# Patient Record
Sex: FEMALE | Race: WHITE | Employment: FULL TIME | URBAN - METROPOLITAN AREA
[De-identification: names, ages, dates, MRNs, and addresses within clinical notes are randomized per-mention and may not be internally consistent; named-entity substitution may affect disease eponyms.]

---

## 2021-04-04 ENCOUNTER — APPOINTMENT (OUTPATIENT)
Dept: CT IMAGING | Age: 41
DRG: 419 | End: 2021-04-04
Attending: INTERNAL MEDICINE
Payer: COMMERCIAL

## 2021-04-04 ENCOUNTER — HOSPITAL ENCOUNTER (INPATIENT)
Age: 41
LOS: 2 days | Discharge: HOME | DRG: 419 | End: 2021-04-06
Attending: INTERNAL MEDICINE | Admitting: INTERNAL MEDICINE
Payer: COMMERCIAL

## 2021-04-04 DIAGNOSIS — K81.0 ACUTE CHOLECYSTITIS: Primary | ICD-10-CM

## 2021-04-04 LAB
ABSOLUTE EOS #: 0.2 K/UL (ref 0–0.4)
ABSOLUTE IMMATURE GRANULOCYTE: ABNORMAL K/UL (ref 0–0.3)
ABSOLUTE LYMPH #: 1.7 K/UL (ref 1–4.8)
ABSOLUTE MONO #: 0.8 K/UL (ref 0.1–1.3)
ALBUMIN SERPL-MCNC: 4.1 G/DL (ref 3.5–5.2)
ALBUMIN/GLOBULIN RATIO: NORMAL (ref 1–2.5)
ALP BLD-CCNC: 64 U/L (ref 35–104)
ALT SERPL-CCNC: 15 U/L (ref 5–33)
ANION GAP SERPL CALCULATED.3IONS-SCNC: 6 MMOL/L (ref 9–17)
AST SERPL-CCNC: 18 U/L
BASOPHILS # BLD: 1 % (ref 0–2)
BASOPHILS ABSOLUTE: 0.1 K/UL (ref 0–0.2)
BILIRUB SERPL-MCNC: 0.39 MG/DL (ref 0.3–1.2)
BILIRUBIN DIRECT: 0.12 MG/DL
BILIRUBIN, INDIRECT: 0.27 MG/DL (ref 0–1)
BUN BLDV-MCNC: 8 MG/DL (ref 6–20)
BUN/CREAT BLD: ABNORMAL (ref 9–20)
CALCIUM SERPL-MCNC: 8.8 MG/DL (ref 8.6–10.4)
CHLORIDE BLD-SCNC: 107 MMOL/L (ref 98–107)
CO2: 27 MMOL/L (ref 20–31)
CREAT SERPL-MCNC: 0.73 MG/DL (ref 0.5–0.9)
DIFFERENTIAL TYPE: ABNORMAL
EOSINOPHILS RELATIVE PERCENT: 2 % (ref 0–4)
GFR AFRICAN AMERICAN: >60 ML/MIN
GFR NON-AFRICAN AMERICAN: >60 ML/MIN
GFR SERPL CREATININE-BSD FRML MDRD: ABNORMAL ML/MIN/{1.73_M2}
GFR SERPL CREATININE-BSD FRML MDRD: ABNORMAL ML/MIN/{1.73_M2}
GLOBULIN: NORMAL G/DL (ref 1.5–3.8)
GLUCOSE BLD-MCNC: 124 MG/DL (ref 70–99)
HCT VFR BLD CALC: 42.7 % (ref 36–46)
HEMOGLOBIN: 14.1 G/DL (ref 12–16)
IMMATURE GRANULOCYTES: ABNORMAL %
INR BLD: 1
LIPASE: 21 U/L (ref 13–60)
LYMPHOCYTES # BLD: 16 % (ref 24–44)
MCH RBC QN AUTO: 29.9 PG (ref 26–34)
MCHC RBC AUTO-ENTMCNC: 32.9 G/DL (ref 31–37)
MCV RBC AUTO: 90.9 FL (ref 80–100)
MONOCYTES # BLD: 7 % (ref 1–7)
NRBC AUTOMATED: ABNORMAL PER 100 WBC
PDW BLD-RTO: 13 % (ref 11.5–14.9)
PLATELET # BLD: 352 K/UL (ref 150–450)
PLATELET ESTIMATE: ABNORMAL
PMV BLD AUTO: 6.8 FL (ref 6–12)
POTASSIUM SERPL-SCNC: 3.9 MMOL/L (ref 3.7–5.3)
PROTHROMBIN TIME: 13.3 SEC (ref 11.8–14.6)
RBC # BLD: 4.7 M/UL (ref 4–5.2)
RBC # BLD: ABNORMAL 10*6/UL
SEG NEUTROPHILS: 74 % (ref 36–66)
SEGMENTED NEUTROPHILS ABSOLUTE COUNT: 8.4 K/UL (ref 1.3–9.1)
SODIUM BLD-SCNC: 140 MMOL/L (ref 135–144)
TOTAL PROTEIN: 6.6 G/DL (ref 6.4–8.3)
WBC # BLD: 11.2 K/UL (ref 3.5–11)
WBC # BLD: ABNORMAL 10*3/UL

## 2021-04-04 PROCEDURE — 83690 ASSAY OF LIPASE: CPT

## 2021-04-04 PROCEDURE — 6370000000 HC RX 637 (ALT 250 FOR IP): Performed by: SURGERY

## 2021-04-04 PROCEDURE — 80048 BASIC METABOLIC PNL TOTAL CA: CPT

## 2021-04-04 PROCEDURE — G0378 HOSPITAL OBSERVATION PER HR: HCPCS

## 2021-04-04 PROCEDURE — 2580000003 HC RX 258: Performed by: SURGERY

## 2021-04-04 PROCEDURE — 80076 HEPATIC FUNCTION PANEL: CPT

## 2021-04-04 PROCEDURE — 85025 COMPLETE CBC W/AUTO DIFF WBC: CPT

## 2021-04-04 PROCEDURE — 96375 TX/PRO/DX INJ NEW DRUG ADDON: CPT

## 2021-04-04 PROCEDURE — 2500000003 HC RX 250 WO HCPCS: Performed by: SURGERY

## 2021-04-04 PROCEDURE — 36415 COLL VENOUS BLD VENIPUNCTURE: CPT

## 2021-04-04 PROCEDURE — 1200000000 HC SEMI PRIVATE

## 2021-04-04 PROCEDURE — 6360000004 HC RX CONTRAST MEDICATION: Performed by: SURGERY

## 2021-04-04 PROCEDURE — 85610 PROTHROMBIN TIME: CPT

## 2021-04-04 PROCEDURE — 96366 THER/PROPH/DIAG IV INF ADDON: CPT

## 2021-04-04 PROCEDURE — 96365 THER/PROPH/DIAG IV INF INIT: CPT

## 2021-04-04 PROCEDURE — G0379 DIRECT REFER HOSPITAL OBSERV: HCPCS

## 2021-04-04 PROCEDURE — 6360000002 HC RX W HCPCS: Performed by: SURGERY

## 2021-04-04 PROCEDURE — 74177 CT ABD & PELVIS W/CONTRAST: CPT

## 2021-04-04 RX ORDER — FENTANYL CITRATE 50 UG/ML
50 INJECTION, SOLUTION INTRAMUSCULAR; INTRAVENOUS
Status: DISCONTINUED | OUTPATIENT
Start: 2021-04-04 | End: 2021-04-06 | Stop reason: HOSPADM

## 2021-04-04 RX ORDER — SODIUM CHLORIDE 9 MG/ML
INJECTION, SOLUTION INTRAVENOUS CONTINUOUS
Status: DISCONTINUED | OUTPATIENT
Start: 2021-04-04 | End: 2021-04-06 | Stop reason: HOSPADM

## 2021-04-04 RX ORDER — ACETAMINOPHEN 325 MG/1
650 TABLET ORAL EVERY 6 HOURS PRN
Status: DISCONTINUED | OUTPATIENT
Start: 2021-04-04 | End: 2021-04-06 | Stop reason: HOSPADM

## 2021-04-04 RX ORDER — SODIUM CHLORIDE 0.9 % (FLUSH) 0.9 %
10 SYRINGE (ML) INJECTION PRN
Status: DISCONTINUED | OUTPATIENT
Start: 2021-04-04 | End: 2021-04-06 | Stop reason: HOSPADM

## 2021-04-04 RX ORDER — FENTANYL CITRATE 50 UG/ML
100 INJECTION, SOLUTION INTRAMUSCULAR; INTRAVENOUS
Status: DISCONTINUED | OUTPATIENT
Start: 2021-04-04 | End: 2021-04-06 | Stop reason: HOSPADM

## 2021-04-04 RX ORDER — ONDANSETRON 2 MG/ML
4 INJECTION INTRAMUSCULAR; INTRAVENOUS EVERY 6 HOURS PRN
Status: DISCONTINUED | OUTPATIENT
Start: 2021-04-04 | End: 2021-04-06 | Stop reason: HOSPADM

## 2021-04-04 RX ORDER — 0.9 % SODIUM CHLORIDE 0.9 %
80 INTRAVENOUS SOLUTION INTRAVENOUS ONCE
Status: COMPLETED | OUTPATIENT
Start: 2021-04-04 | End: 2021-04-04

## 2021-04-04 RX ADMIN — ACETAMINOPHEN 650 MG: 325 TABLET, FILM COATED ORAL at 21:49

## 2021-04-04 RX ADMIN — FAMOTIDINE 20 MG: 10 INJECTION, SOLUTION INTRAVENOUS at 21:48

## 2021-04-04 RX ADMIN — SODIUM CHLORIDE 80 ML: 9 INJECTION, SOLUTION INTRAVENOUS at 21:13

## 2021-04-04 RX ADMIN — SODIUM CHLORIDE, PRESERVATIVE FREE 10 ML: 5 INJECTION INTRAVENOUS at 21:13

## 2021-04-04 RX ADMIN — SODIUM CHLORIDE: 9 INJECTION, SOLUTION INTRAVENOUS at 21:50

## 2021-04-04 RX ADMIN — IOPAMIDOL 75 ML: 755 INJECTION, SOLUTION INTRAVENOUS at 21:13

## 2021-04-04 RX ADMIN — PIPERACILLIN SODIUM AND TAZOBACTAM SODIUM 3375 MG: 3; .375 INJECTION, POWDER, LYOPHILIZED, FOR SOLUTION INTRAVENOUS at 21:50

## 2021-04-04 RX ADMIN — IOHEXOL 50 ML: 240 INJECTION, SOLUTION INTRATHECAL; INTRAVASCULAR; INTRAVENOUS; ORAL at 19:53

## 2021-04-04 NOTE — PROGRESS NOTES
RN verified via records from [facility] emergency department paperwork and care everywhere that patient has a NEGATIVE COVID test and a NEGATIVE urine pregnancy test at their facility 4/4 prior to transfer via squad to [address] [place] [address] room 2071.

## 2021-04-05 ENCOUNTER — APPOINTMENT (OUTPATIENT)
Dept: NUCLEAR MEDICINE | Age: 41
DRG: 419 | End: 2021-04-05
Attending: INTERNAL MEDICINE
Payer: COMMERCIAL

## 2021-04-05 ENCOUNTER — ANESTHESIA EVENT (OUTPATIENT)
Dept: OPERATING ROOM | Age: 41
DRG: 419 | End: 2021-04-05
Payer: COMMERCIAL

## 2021-04-05 LAB
ABSOLUTE EOS #: 0.3 K/UL (ref 0–0.4)
ABSOLUTE IMMATURE GRANULOCYTE: ABNORMAL K/UL (ref 0–0.3)
ABSOLUTE LYMPH #: 2 K/UL (ref 1–4.8)
ABSOLUTE MONO #: 0.7 K/UL (ref 0.1–1.3)
ALBUMIN SERPL-MCNC: 3.6 G/DL (ref 3.5–5.2)
ALBUMIN/GLOBULIN RATIO: ABNORMAL (ref 1–2.5)
ALP BLD-CCNC: 58 U/L (ref 35–104)
ALT SERPL-CCNC: 17 U/L (ref 5–33)
ANION GAP SERPL CALCULATED.3IONS-SCNC: 7 MMOL/L (ref 9–17)
AST SERPL-CCNC: 26 U/L
BASOPHILS # BLD: 1 % (ref 0–2)
BASOPHILS ABSOLUTE: 0.1 K/UL (ref 0–0.2)
BILIRUB SERPL-MCNC: 0.45 MG/DL (ref 0.3–1.2)
BILIRUBIN DIRECT: 0.13 MG/DL
BILIRUBIN, INDIRECT: 0.32 MG/DL (ref 0–1)
BUN BLDV-MCNC: 7 MG/DL (ref 6–20)
BUN/CREAT BLD: ABNORMAL (ref 9–20)
CALCIUM SERPL-MCNC: 8.3 MG/DL (ref 8.6–10.4)
CHLORIDE BLD-SCNC: 108 MMOL/L (ref 98–107)
CO2: 25 MMOL/L (ref 20–31)
CREAT SERPL-MCNC: 0.77 MG/DL (ref 0.5–0.9)
DIFFERENTIAL TYPE: ABNORMAL
EOSINOPHILS RELATIVE PERCENT: 4 % (ref 0–4)
GFR AFRICAN AMERICAN: >60 ML/MIN
GFR NON-AFRICAN AMERICAN: >60 ML/MIN
GFR SERPL CREATININE-BSD FRML MDRD: ABNORMAL ML/MIN/{1.73_M2}
GFR SERPL CREATININE-BSD FRML MDRD: ABNORMAL ML/MIN/{1.73_M2}
GLOBULIN: ABNORMAL G/DL (ref 1.5–3.8)
GLUCOSE BLD-MCNC: 121 MG/DL (ref 70–99)
HCT VFR BLD CALC: 39.1 % (ref 36–46)
HEMOGLOBIN: 13 G/DL (ref 12–16)
IMMATURE GRANULOCYTES: ABNORMAL %
LIPASE: 22 U/L (ref 13–60)
LYMPHOCYTES # BLD: 24 % (ref 24–44)
MCH RBC QN AUTO: 30.1 PG (ref 26–34)
MCHC RBC AUTO-ENTMCNC: 33.2 G/DL (ref 31–37)
MCV RBC AUTO: 90.6 FL (ref 80–100)
MONOCYTES # BLD: 8 % (ref 1–7)
NRBC AUTOMATED: ABNORMAL PER 100 WBC
PDW BLD-RTO: 13.3 % (ref 11.5–14.9)
PLATELET # BLD: 299 K/UL (ref 150–450)
PLATELET ESTIMATE: ABNORMAL
PMV BLD AUTO: 6.7 FL (ref 6–12)
POTASSIUM SERPL-SCNC: 4.3 MMOL/L (ref 3.7–5.3)
RBC # BLD: 4.32 M/UL (ref 4–5.2)
RBC # BLD: ABNORMAL 10*6/UL
SARS-COV-2, RAPID: NOT DETECTED
SEG NEUTROPHILS: 63 % (ref 36–66)
SEGMENTED NEUTROPHILS ABSOLUTE COUNT: 5.3 K/UL (ref 1.3–9.1)
SODIUM BLD-SCNC: 140 MMOL/L (ref 135–144)
SPECIMEN DESCRIPTION: NORMAL
TOTAL PROTEIN: 5.7 G/DL (ref 6.4–8.3)
WBC # BLD: 8.4 K/UL (ref 3.5–11)
WBC # BLD: ABNORMAL 10*3/UL

## 2021-04-05 PROCEDURE — 87635 SARS-COV-2 COVID-19 AMP PRB: CPT

## 2021-04-05 PROCEDURE — 1200000000 HC SEMI PRIVATE

## 2021-04-05 PROCEDURE — 93005 ELECTROCARDIOGRAM TRACING: CPT

## 2021-04-05 PROCEDURE — 80048 BASIC METABOLIC PNL TOTAL CA: CPT

## 2021-04-05 PROCEDURE — 36415 COLL VENOUS BLD VENIPUNCTURE: CPT

## 2021-04-05 PROCEDURE — 6360000002 HC RX W HCPCS: Performed by: INTERNAL MEDICINE

## 2021-04-05 PROCEDURE — 99222 1ST HOSP IP/OBS MODERATE 55: CPT | Performed by: INTERNAL MEDICINE

## 2021-04-05 PROCEDURE — 3430000000 HC RX DIAGNOSTIC RADIOPHARMACEUTICAL: Performed by: SURGERY

## 2021-04-05 PROCEDURE — 6360000002 HC RX W HCPCS: Performed by: SURGERY

## 2021-04-05 PROCEDURE — 85025 COMPLETE CBC W/AUTO DIFF WBC: CPT

## 2021-04-05 PROCEDURE — 2580000003 HC RX 258: Performed by: SURGERY

## 2021-04-05 PROCEDURE — 96375 TX/PRO/DX INJ NEW DRUG ADDON: CPT

## 2021-04-05 PROCEDURE — 83690 ASSAY OF LIPASE: CPT

## 2021-04-05 PROCEDURE — 78226 HEPATOBILIARY SYSTEM IMAGING: CPT

## 2021-04-05 PROCEDURE — G0378 HOSPITAL OBSERVATION PER HR: HCPCS

## 2021-04-05 PROCEDURE — 80076 HEPATIC FUNCTION PANEL: CPT

## 2021-04-05 PROCEDURE — 2500000003 HC RX 250 WO HCPCS: Performed by: SURGERY

## 2021-04-05 PROCEDURE — A9537 TC99M MEBROFENIN: HCPCS | Performed by: SURGERY

## 2021-04-05 PROCEDURE — 6370000000 HC RX 637 (ALT 250 FOR IP): Performed by: SURGERY

## 2021-04-05 PROCEDURE — 96376 TX/PRO/DX INJ SAME DRUG ADON: CPT

## 2021-04-05 PROCEDURE — 96366 THER/PROPH/DIAG IV INF ADDON: CPT

## 2021-04-05 PROCEDURE — 6360000002 HC RX W HCPCS: Performed by: RADIOLOGY

## 2021-04-05 RX ORDER — SODIUM CHLORIDE 0.9 % (FLUSH) 0.9 %
10 SYRINGE (ML) INJECTION PRN
Status: DISCONTINUED | OUTPATIENT
Start: 2021-04-05 | End: 2021-04-06 | Stop reason: HOSPADM

## 2021-04-05 RX ORDER — MORPHINE SULFATE 4 MG/ML
3.9 INJECTION, SOLUTION INTRAMUSCULAR; INTRAVENOUS ONCE
Status: COMPLETED | OUTPATIENT
Start: 2021-04-05 | End: 2021-04-05

## 2021-04-05 RX ADMIN — ENOXAPARIN SODIUM 40 MG: 40 INJECTION SUBCUTANEOUS at 15:16

## 2021-04-05 RX ADMIN — FAMOTIDINE 20 MG: 10 INJECTION, SOLUTION INTRAVENOUS at 10:51

## 2021-04-05 RX ADMIN — SODIUM CHLORIDE, PRESERVATIVE FREE 10 ML: 5 INJECTION INTRAVENOUS at 09:22

## 2021-04-05 RX ADMIN — PIPERACILLIN SODIUM AND TAZOBACTAM SODIUM 3375 MG: 3; .375 INJECTION, POWDER, LYOPHILIZED, FOR SOLUTION INTRAVENOUS at 10:51

## 2021-04-05 RX ADMIN — PIPERACILLIN SODIUM AND TAZOBACTAM SODIUM 3375 MG: 3; .375 INJECTION, POWDER, LYOPHILIZED, FOR SOLUTION INTRAVENOUS at 19:31

## 2021-04-05 RX ADMIN — ONDANSETRON 4 MG: 2 INJECTION INTRAMUSCULAR; INTRAVENOUS at 15:16

## 2021-04-05 RX ADMIN — SODIUM CHLORIDE, PRESERVATIVE FREE 10 ML: 5 INJECTION INTRAVENOUS at 08:06

## 2021-04-05 RX ADMIN — MORPHINE SULFATE 3.9 MG: 4 INJECTION, SOLUTION INTRAMUSCULAR; INTRAVENOUS at 09:27

## 2021-04-05 RX ADMIN — SODIUM CHLORIDE: 9 INJECTION, SOLUTION INTRAVENOUS at 10:05

## 2021-04-05 RX ADMIN — Medication 2.5 MILLICURIE: at 09:22

## 2021-04-05 RX ADMIN — ACETAMINOPHEN 650 MG: 325 TABLET, FILM COATED ORAL at 17:43

## 2021-04-05 RX ADMIN — FAMOTIDINE 20 MG: 10 INJECTION, SOLUTION INTRAVENOUS at 21:14

## 2021-04-05 RX ADMIN — PIPERACILLIN SODIUM AND TAZOBACTAM SODIUM 3375 MG: 3; .375 INJECTION, POWDER, LYOPHILIZED, FOR SOLUTION INTRAVENOUS at 03:45

## 2021-04-05 RX ADMIN — Medication 6.4 MILLICURIE: at 08:06

## 2021-04-05 RX ADMIN — SODIUM CHLORIDE: 9 INJECTION, SOLUTION INTRAVENOUS at 17:42

## 2021-04-05 ASSESSMENT — ENCOUNTER SYMPTOMS
STRIDOR: 0
SHORTNESS OF BREATH: 0

## 2021-04-05 ASSESSMENT — LIFESTYLE VARIABLES: SMOKING_STATUS: 0

## 2021-04-05 ASSESSMENT — PAIN SCALES - GENERAL: PAINLEVEL_OUTOF10: 0

## 2021-04-05 NOTE — PLAN OF CARE
Problem: Pain:  Goal: Pain level will decrease  4/5/2021 0411 by Lexy Segovia RN  Outcome: Ongoing  4/4/2021 1849 by Barbara Villegas RN  Outcome: Ongoing  Goal: Control of acute pain  4/5/2021 0411 by Lexy Segovia RN  Outcome: Ongoing  4/4/2021 1849 by Barbara Villegas RN  Outcome: Ongoing  Goal: Control of chronic pain  4/5/2021 0411 by Lexy Segovia RN  Outcome: Ongoing  4/4/2021 1849 by Barbara Villegas RN  Outcome: Ongoing     Problem: Nausea/Vomiting:  Goal: Absence of nausea/vomiting  4/5/2021 0411 by Lexy Segovia RN  Outcome: Ongoing  4/4/2021 1849 by Barbara Villegas RN  Outcome: Ongoing  Goal: Able to drink  4/5/2021 0411 by Lexy Segovia RN  Outcome: Ongoing  4/4/2021 1849 by Barbara Villegas RN  Outcome: Ongoing  Goal: Able to eat  4/5/2021 0411 by Lexy Segovia RN  Outcome: Ongoing  4/4/2021 1849 by Barbara Villegas RN  Outcome: Ongoing  Goal: Ability to achieve adequate nutritional intake will improve  4/5/2021 0411 by Lexy Segovia RN  Outcome: Ongoing  4/4/2021 1849 by Barbara Villegas RN  Outcome: Ongoing

## 2021-04-05 NOTE — CONSULTS
distress and cooperative to examination with proper mood and affect. SKIN: Skin color, texture, turgor normal. No rashes or lesions. LYMPH: no cervical nodes, no inguinal nodes  HEENT: Head is normocephalic, atraumatic. EOMI, PERRLA  NECK: Supple, symmetrical, trachea midline, no adenopathy, thyroid symmetric, not enlarged and no tenderness, skin normal  CHEST/LUNGS: chest symmetric with normal A/P diameter, normal respiratory rate and rhythm, lungs clear to auscultation without wheezes, rales or rhonchi. No accessory muscle use. Scars None   CARDIOVASCULAR: Heart regular rate and rhythm Normal S1 and S2. . Carotid and femoral pulses 2+/4 and equal bilaterally  ABDOMEN: Normal shape. . No scar(s) present. Normal bowel sounds. No bruits. Soft, nondistended, no masses or organomegaly. no evidence of hernia. Percussion: Normal without hepatosplenomegally. Tenderness: RUQ  RECTAL: deferred, not clinically indicated  NEUROLOGIC: There are no focalizing motor or sensory deficits. CN II-XII are grossly intact.   EXTREMITIES: no cyanosis, no clubbing and no edema    LABS:   CBC with Differential:    Lab Results   Component Value Date    WBC 8.4 04/05/2021    RBC 4.32 04/05/2021    HGB 13.0 04/05/2021    HCT 39.1 04/05/2021     04/05/2021    MCV 90.6 04/05/2021    MCH 30.1 04/05/2021    MCHC 33.2 04/05/2021    RDW 13.3 04/05/2021    LYMPHOPCT 24 04/05/2021    MONOPCT 8 04/05/2021    BASOPCT 1 04/05/2021    MONOSABS 0.70 04/05/2021    LYMPHSABS 2.00 04/05/2021    EOSABS 0.30 04/05/2021    BASOSABS 0.10 04/05/2021    DIFFTYPE NOT REPORTED 04/05/2021     BMP:    Lab Results   Component Value Date     04/05/2021    K 4.3 04/05/2021     04/05/2021    CO2 25 04/05/2021    BUN 7 04/05/2021    LABALBU 3.6 04/05/2021    CREATININE 0.77 04/05/2021    CALCIUM 8.3 04/05/2021    GFRAA >60 04/05/2021    LABGLOM >60 04/05/2021    GLUCOSE 121 04/05/2021     Hepatic Function Panel:    Lab Results   Component Value Date ALKPHOS 58 04/05/2021    ALT 17 04/05/2021    AST 26 04/05/2021    PROT 5.7 04/05/2021    BILITOT 0.45 04/05/2021    BILIDIR 0.13 04/05/2021    IBILI 0.32 04/05/2021    LABALBU 3.6 04/05/2021     Calcium:    Lab Results   Component Value Date    CALCIUM 8.3 04/05/2021     Magnesium:  No results found for: MG  Phosphorus:  No results found for: PHOS  PT/INR:    Lab Results   Component Value Date    PROTIME 13.3 04/04/2021    INR 1.0 04/04/2021     ABG:  No results found for: PHART, PH, VKJ9FNO, PCO2, PO2ART, PO2, KOQ3LIQ, HCO3, BEART, BE, THGBART, THB, BKI9OID, B6DHANYF, O2SAT  Urine Culture:  No components found for: CURINE  Blood Culture:  No components found for: CBLOOD, CFUNGUSBL  Stool Culture:  No components found for: CSTOOL    RADIOLOGY:   I have personally reviewed the following films:  Ct Abdomen Pelvis W Iv Contrast Additional Contrast? Oral    Result Date: 4/4/2021  EXAMINATION: CT OF THE ABDOMEN AND PELVIS WITH CONTRAST 4/4/2021 3:09 pm TECHNIQUE: CT of the abdomen and pelvis was performed with the administration of intravenous contrast. Multiplanar reformatted images are provided for review. Dose modulation, iterative reconstruction, and/or weight based adjustment of the mA/kV was utilized to reduce the radiation dose to as low as reasonably achievable. COMPARISON: None. HISTORY: ORDERING SYSTEM PROVIDED HISTORY: ab pain, cholecystitis TECHNOLOGIST PROVIDED HISTORY: ab pain, cholecystitis Reason for Exam: ab pain for one day, cholecystitis Acuity: Unknown Type of Exam: Unknown FINDINGS: Lower Chest: Dependent changes are present within the lung bases. Organs: A 9 mm low-attenuation lesion is present within the posterior segment of the right lobe of the liver, suggesting a small hemangioma or cyst. Faintly calcified gallstones are present. Gallbladder wall thickening is present, with pericholecystic fluid, and a mild degree of inflammation consistent with acute cholecystitis.   Common bile duct is nondilated. The spleen, pancreas, adrenal glands, appear normal.  Cortical cyst formation is present on the left kidney. No intrarenal calculi or hydronephrosis is present. GI/Bowel: Stomach appears normal.  Small bowel is normal without evidence of obstruction. The appendix is normal.  No inflammatory change or wall thickening is present involving the large bowel. Pelvis: Urinary bladder appears normal.  Uterus is normal in size. A pedunculated fibroid measuring 3 cm is present projecting off the right fundal portion of the uterus. Follicles are present on the ovaries. Small amount of free fluid is present within the cul-de-sac, likely physiologic Peritoneum/Retroperitoneum: No free air is present within the abdomen or pelvis. No aneurysm formation is present. No pathological adenopathy is present. Bones/Soft Tissues: No acute osseous or soft tissue abnormality is present. A small umbilical hernia is present containing fat. 1. CT findings consistent with acute cholecystitis 2. Small amount of free fluid present within the cul-de-sac, likely physiologic 3. 3 cm pedunculated fibroid extending off the right fundal portion of the uterus         IMPRESSION:   1. Acute cholecystitis   2. No abdominal surgeries in the past    does not have any pertinent problems on file. PLAN:   1. NPO  2. HIDA scan today  3. Daily CBC, BMP, trend LFT's  4. IVF, IV Zosyn  5. GI prophylaxis   6. Pain and nausea management   7. Will tentatively schedule patient for robotic laparoscopic cholecystectomy tomorrow  8. Continue medical management       Thank you for this interesting consult and for allowing us to participate in the care of this patient. If you have any questions please don't hesitate to call.       Electronically signed by CHANDRAKANT Moore  on 4/5/2021 at 7:20 AM

## 2021-04-05 NOTE — PLAN OF CARE
Problem: Pain:  Goal: Pain level will decrease  Description: Pain level will decrease  4/5/2021 1623 by Arabella White RN  Outcome: Ongoing  Note: No c/o any discomfort this shift. 4/5/2021 0411 by Albert Sandy RN  Outcome: Ongoing  Goal: Control of acute pain  Description: Control of acute pain  4/5/2021 1623 by Arabella White RN  Outcome: Ongoing  Note: No c/o any discomfort this shift. 4/5/2021 0411 by Albert Sandy RN  Outcome: Ongoing  Goal: Control of chronic pain  Description: Control of chronic pain  4/5/2021 1623 by Arabella White RN  Outcome: Ongoing  Note: No c/o any discomfort this shift.    4/5/2021 0411 by Albert Sandy RN  Outcome: Ongoing     Problem: Nausea/Vomiting:  Goal: Absence of nausea/vomiting  Description: Absence of nausea/vomiting  4/5/2021 1623 by Arabella White RN  Outcome: Ongoing  Note: Nausea/vomiting controlled with prescribed medications   4/5/2021 0411 by Albert Sandy RN  Outcome: Ongoing  Goal: Able to drink  Description: Able to drink  4/5/2021 1623 by Arabella White RN  Outcome: Ongoing  4/5/2021 0411 by Albert Sandy RN  Outcome: Ongoing  Goal: Able to eat  Description: Able to eat  4/5/2021 1623 by Arabella White RN  Outcome: Ongoing  4/5/2021 0411 by Albert Sandy RN  Outcome: Ongoing  Goal: Ability to achieve adequate nutritional intake will improve  Description: Ability to achieve adequate nutritional intake will improve  4/5/2021 1623 by Arabella White RN  Outcome: Ongoing  4/5/2021 0411 by Albert Sandy RN  Outcome: Ongoing

## 2021-04-05 NOTE — H&P
Formerly [facility] Internal Medicine    CONSULTATION / HISTORY AND PHYSICAL EXAMINATION            Date:   4/5/2021  Patient name:  Dorene Lee  Date of admission:  4/4/2021  6:43 PM  MRN:   [id number]  Account:  [de-identified]  YOB: 1980  PCP:    Gretchen Martínez  Room:   2071/2071-01  Code Status:    No Order    Physician Requesting Consult: Stef Silverman MD    Reason for Consult:  medical management    Chief Complaint:     No chief complaint on file. History Obtained From:     Patient medical record nursing staff    History of Present Illness:   Patient is a very healthy young woman, does not take any medication on regular basis, she was admitted yesterday with abdominal pain. Abdominal pain started on Saturday night around 11 PM, constant in nature, associated with nausea, mainly located right upper quadrant, no aggravating or relieving factor. Patient denied any complaints of loose stools, blood in stools, no vaginal discharge or vaginal bleeding. Patient presented to outlying facility, underwent ultrasound abdomen concerning for acute cholecystitis, patient transferred to [facility] she underwent CT abdomen pelvis, subsequently undergo HIDA scan concerning for cholecystitis  Patient evaluated by general surgery, started on IV Zosyn    Past Medical History:     No past medical history on file. Past Surgical History:     No past surgical history on file. Medications Prior to Admission:     Prior to Admission medications    Not on File        Allergies:     Patient has no known allergies. Social History:     Tobacco:    reports that she has never smoked. She has never used smokeless tobacco.  Alcohol:      has no history on file for alcohol. Drug Use:  has no history on file for drug. Family History:     No family history on file. Review of Systems:     Positive and Negative as described in HPI.     CONSTITUTIONAL:  negative for fevers, chills, sweats, fatigue, weight loss  HEENT:  negative for vision, hearing changes, runny nose, throat pain  RESPIRATORY:  negative for shortness of breath, cough, congestion, wheezing. CARDIOVASCULAR:  negative for chest pain, palpitations. GASTROINTESTINAL: Abdominal pain, nausea  GENITOURINARY:  negative for difficulty of urination, burning with urination, frequency   INTEGUMENT:  negative for rash, skin lesions, easy bruising   HEMATOLOGIC/LYMPHATIC:  negative for swelling/edema   ALLERGIC/IMMUNOLOGIC:  negative for urticaria , itching  ENDOCRINE:  negative increase in drinking, increase in urination, hot or cold intolerance  MUSCULOSKELETAL:  negative joint pains, muscle aches, swelling of joints  NEUROLOGICAL:  negative for headaches, dizziness, lightheadedness, numbness, pain, tingling extremities  BEHAVIOR/PSYCH:      Physical Exam:     /66   Pulse 88   Temp 98.5 °F (36.9 °C) (Oral)   Resp 15   Ht 5' 9\" (1.753 m)   Wt 215 lb (97.5 kg)   SpO2 98%   BMI 31.75 kg/m²   Temp (24hrs), Av.2 °F (36.8 °C), Min:97.9 °F (36.6 °C), Max:98.5 °F (36.9 °C)    No results for input(s): POCGLU in the last 72 hours. Intake/Output Summary (Last 24 hours) at 2021 1340  Last data filed at 2021 0558  Gross per 24 hour   Intake 1275 ml   Output --   Net 1275 ml       General Appearance:  alert, well appearing, and in no acute distress  Mental status: oriented to person, place, and time with normal affect  Head:  normocephalic, atraumatic. Eye: no icterus, redness, pupils equal and reactive, extraocular eye movements intact, conjunctiva clear  Ear: normal external ear, no discharge, hearing intact  Nose:  no drainage noted  Mouth: mucous membranes moist  Neck: supple, no carotid bruits, thyroid not palpable  Lungs: Bilateral equal air entry, clear to ausculation, no wheezing, rales or rhonchi, normal effort  Cardiovascular: normal rate, regular rhythm, no murmur, gallop, rub.   Abdomen: Tenderness right upper quadrant, negative Karimi sign  Neurologic: There are no new focal motor or sensory deficits, normal muscle tone and bulk, no abnormal sensation, normal speech, cranial nerves II through XII grossly intact  Skin: No gross lesions, rashes, bruising or bleeding on exposed skin area  Extremities:  peripheral pulses palpable, no pedal edema or calf pain with palpation  Psych:      Investigations:      Laboratory Testing:  Recent Results (from the past 24 hour(s))   CBC with DIFF    Collection Time: 04/04/21  7:41 PM   Result Value Ref Range    WBC 11.2 (H) 3.5 - 11.0 k/uL    RBC 4.70 4.0 - 5.2 m/uL    Hemoglobin 14.1 12.0 - 16.0 g/dL    Hematocrit 42.7 36 - 46 %    MCV 90.9 80 - 100 fL    MCH 29.9 26 - 34 pg    MCHC 32.9 31 - 37 g/dL    RDW 13.0 11.5 - 14.9 %    Platelets 252 785 - 842 k/uL    MPV 6.8 6.0 - 12.0 fL    NRBC Automated NOT REPORTED per 100 WBC    Differential Type NOT REPORTED     Seg Neutrophils 74 (H) 36 - 66 %    Lymphocytes 16 (L) 24 - 44 %    Monocytes 7 1 - 7 %    Eosinophils % 2 0 - 4 %    Basophils 1 0 - 2 %    Immature Granulocytes NOT REPORTED 0 %    Segs Absolute 8.40 1.3 - 9.1 k/uL    Absolute Lymph # 1.70 1.0 - 4.8 k/uL    Absolute Mono # 0.80 0.1 - 1.3 k/uL    Absolute Eos # 0.20 0.0 - 0.4 k/uL    Basophils Absolute 0.10 0.0 - 0.2 k/uL    Absolute Immature Granulocyte NOT REPORTED 0.00 - 0.30 k/uL    WBC Morphology NOT REPORTED     RBC Morphology NOT REPORTED     Platelet Estimate NOT REPORTED    Basic Metabolic Panel w/ Reflex to MG    Collection Time: 04/04/21  7:41 PM   Result Value Ref Range    Glucose 124 (H) 70 - 99 mg/dL    BUN 8 6 - 20 mg/dL    CREATININE 0.73 0.50 - 0.90 mg/dL    Bun/Cre Ratio NOT REPORTED 9 - 20    Calcium 8.8 8.6 - 10.4 mg/dL    Sodium 140 135 - 144 mmol/L    Potassium 3.9 3.7 - 5.3 mmol/L    Chloride 107 98 - 107 mmol/L    CO2 27 20 - 31 mmol/L    Anion Gap 6 (L) 9 - 17 mmol/L    GFR Non-African American >60 >60 mL/min    GFR African American >60 >60 mL/min    GFR Comment          GFR Staging NOT REPORTED    Liver Profile    Collection Time: 04/04/21  7:41 PM   Result Value Ref Range    Albumin 4.1 3.5 - 5.2 g/dL    Alkaline Phosphatase 64 35 - 104 U/L    ALT 15 5 - 33 U/L    AST 18 <32 U/L    Total Bilirubin 0.39 0.3 - 1.2 mg/dL    Bilirubin, Direct 0.12 <0.31 mg/dL    Bilirubin, Indirect 0.27 0.00 - 1.00 mg/dL    Total Protein 6.6 6.4 - 8.3 g/dL    Globulin NOT REPORTED 1.5 - 3.8 g/dL    Albumin/Globulin Ratio NOT REPORTED 1.0 - 2.5   Lipase    Collection Time: 04/04/21  7:41 PM   Result Value Ref Range    Lipase 21 13 - 60 U/L   PROTIME-INR    Collection Time: 04/04/21  7:41 PM   Result Value Ref Range    Protime 13.3 11.8 - 14.6 sec    INR 1.0    EKG 12 Lead    Collection Time: 04/05/21  4:54 AM   Result Value Ref Range    Ventricular Rate 87 BPM    Atrial Rate 87 BPM    P-R Interval 184 ms    QRS Duration 82 ms    Q-T Interval 368 ms    QTc Calculation (Bazett) 442 ms    P Axis 56 degrees    R Axis 61 degrees    T Axis 58 degrees   CBC with DIFF    Collection Time: 04/05/21  6:08 AM   Result Value Ref Range    WBC 8.4 3.5 - 11.0 k/uL    RBC 4.32 4.0 - 5.2 m/uL    Hemoglobin 13.0 12.0 - 16.0 g/dL    Hematocrit 39.1 36 - 46 %    MCV 90.6 80 - 100 fL    MCH 30.1 26 - 34 pg    MCHC 33.2 31 - 37 g/dL    RDW 13.3 11.5 - 14.9 %    Platelets 511 114 - 839 k/uL    MPV 6.7 6.0 - 12.0 fL    NRBC Automated NOT REPORTED per 100 WBC    Differential Type NOT REPORTED     Seg Neutrophils 63 36 - 66 %    Lymphocytes 24 24 - 44 %    Monocytes 8 (H) 1 - 7 %    Eosinophils % 4 0 - 4 %    Basophils 1 0 - 2 %    Immature Granulocytes NOT REPORTED 0 %    Segs Absolute 5.30 1.3 - 9.1 k/uL    Absolute Lymph # 2.00 1.0 - 4.8 k/uL    Absolute Mono # 0.70 0.1 - 1.3 k/uL    Absolute Eos # 0.30 0.0 - 0.4 k/uL    Basophils Absolute 0.10 0.0 - 0.2 k/uL    Absolute Immature Granulocyte NOT REPORTED 0.00 - 0.30 k/uL    WBC Morphology NOT REPORTED     RBC Morphology NOT REPORTED

## 2021-04-05 NOTE — ANESTHESIA PRE PROCEDURE
History:  History reviewed. No pertinent past medical history. Past Surgical History:  History reviewed. No pertinent surgical history. Social History:    Social History     Tobacco Use    Smoking status: Never Smoker    Smokeless tobacco: Never Used   Substance Use Topics    Alcohol use: Not on file                                Counseling given: Not Answered      Vital Signs (Current):   Vitals:    04/04/21 1843 04/04/21 1945 04/05/21 0545   BP: 111/75  100/66   Pulse: 99  88   Resp: 16  15   Temp: 97.9 °F (36.6 °C)  98.5 °F (36.9 °C)   TempSrc: Oral  Oral   SpO2: 100%  98%   Weight:  215 lb (97.5 kg)    Height:  5' 9\" (1.753 m)                                               BP Readings from Last 3 Encounters:   04/05/21 100/66       NPO Status:                                                                                 BMI:   Wt Readings from Last 3 Encounters:   04/04/21 215 lb (97.5 kg)     Body mass index is 31.75 kg/m². CBC:   Lab Results   Component Value Date    WBC 8.4 04/05/2021    RBC 4.32 04/05/2021    HGB 13.0 04/05/2021    HCT 39.1 04/05/2021    MCV 90.6 04/05/2021    RDW 13.3 04/05/2021     04/05/2021     LR    CMP:   Lab Results   Component Value Date     04/05/2021    K 4.3 04/05/2021     04/05/2021    CO2 25 04/05/2021    BUN 7 04/05/2021    CREATININE 0.77 04/05/2021    GFRAA >60 04/05/2021    LABGLOM >60 04/05/2021    GLUCOSE 121 04/05/2021    PROT 5.7 04/05/2021    CALCIUM 8.3 04/05/2021    BILITOT 0.45 04/05/2021    ALKPHOS 58 04/05/2021    AST 26 04/05/2021    ALT 17 04/05/2021       POC Tests: No results for input(s): POCGLU, POCNA, POCK, POCCL, POCBUN, POCHEMO, POCHCT in the last 72 hours.     Coags:   Lab Results   Component Value Date    PROTIME 13.3 04/04/2021    INR 1.0 04/04/2021       HCG (If Applicable): No results found for: PREGTESTUR, PREGSERUM, HCG, HCGQUANT     ABGs: No results found for: PHART, PO2ART, EWH6NKL, SXO7QMH, BEART, R6GDRZDU     Type & Screen (If Applicable):  No results found for: LABABO, LABRH    Drug/Infectious Status (If Applicable):  No results found for: HIV, HEPCAB    COVID-19 Screening (If Applicable):   Lab Results   Component Value Date    COVID19 Not Detected 04/05/2021           Anesthesia Evaluation  Patient summary reviewed and Nursing notes reviewed no history of anesthetic complications:   Airway: Mallampati: II  TM distance: >3 FB   Neck ROM: full  Mouth opening: > = 3 FB Dental: normal exam         Pulmonary:Negative Pulmonary ROS and normal exam  breath sounds clear to auscultation      (-) pneumonia, COPD, asthma, shortness of breath, recent URI, sleep apnea, rhonchi, wheezes, rales, stridor and not a current smoker          Patient did not smoke on day of surgery. Cardiovascular:Negative CV ROS  Exercise tolerance: good (>4 METS),       (-) pacemaker, hypertension, valvular problems/murmurs, past MI, CAD, CABG/stent, dysrhythmias,  angina,  CHF, orthopnea, PND,  CHIU, murmur, weak pulses,  friction rub, systolic click, carotid bruit,  JVD and peripheral edema      Rhythm: regular  Rate: normal                    Neuro/Psych:   Negative Neuro/Psych ROS     (-) seizures, neuromuscular disease, TIA, CVA, headaches, psychiatric history and depression/anxiety            GI/Hepatic/Renal: Neg GI/Hepatic/Renal ROS       (-) hiatal hernia, GERD, PUD, hepatitis, liver disease, no renal disease, bowel prep and no morbid obesity       Endo/Other: Negative Endo/Other ROS   (+) no malignancy/cancer. (-) diabetes mellitus, hypothyroidism, hyperthyroidism, blood dyscrasia, arthritis, no electrolyte abnormalities, no malignancy/cancer               Abdominal:           Vascular: negative vascular ROS. - PVD, DVT and PE. Anesthesia Plan      general     ASA 2       Induction: intravenous. MIPS: Postoperative opioids intended and Prophylactic antiemetics administered.   Anesthetic plan and risks discussed with patient. Plan discussed with CRNA.                   Shayne Grimm MD   4/5/2021

## 2021-04-05 NOTE — PROGRESS NOTES
Perfect Serve message sent to Dr. Trevon Olivo regarding pt CT abdomen results. Awaiting response at this time.

## 2021-04-05 NOTE — CARE COORDINATION
CASE MANAGEMENT NOTE:    Admission Date:  4/4/2021 Martin Melendez is a 36 y.o.  female    Admitted for : Acute cholecystitis [K81.0]    Met with:  Patient    PCP:  Dr. Martinez Pineda:  [facility] [address]      Is patient is a : No    Is patient alert and oriented at time of discussion:  Yes    Current Residence/ Living Arrangements:  independently at home             Current Services PTA:  No    Does patient go to outpatient dialysis: No  If yes, location and chair time: N/A    Is patient agreeable to VNS: No    Freedom of choice provided:  Yes    List of [address] provided: No    VNS chosen:  No    DME:  none    Home Oxygen: No    Nebulizer: No    CPAP/BIPAP: No    Supplier: N/A    Potential Assistance Needed: No    SNF needed: No    Freedom of choice and list provided: NA    Pharmacy:  [facility]       Does Patient want to use MEDS to BEDS? No    Is patient currently receiving oral anticoagulation therapy? No    Is the Patient an [facility] with Readmission Risk Score greater than 14%? No  If yes, pt needs a follow up appointment made within 7 days. Family Members/Caregivers that pt would like involved in their care:    Yes    If yes, list name here:   Peter    Transportation Provider:  Patient             Discharge Plan:  4/5: [facility] - Patient is from home with  and 2 adult children. She is independent and drives. DME - None. Declines need for VNS. HIDA today and is on schedule for lap joon tomorrow at 7:30 AM. IV zosyn.  //BRIANA                 Electronically signed by: Tiarra Santa RN on 4/5/2021 at 11:48 AM

## 2021-04-06 ENCOUNTER — ANESTHESIA (OUTPATIENT)
Dept: OPERATING ROOM | Age: 41
DRG: 419 | End: 2021-04-06
Payer: COMMERCIAL

## 2021-04-06 VITALS
DIASTOLIC BLOOD PRESSURE: 82 MMHG | OXYGEN SATURATION: 95 % | SYSTOLIC BLOOD PRESSURE: 126 MMHG | WEIGHT: 215 LBS | HEART RATE: 91 BPM | RESPIRATION RATE: 16 BRPM | TEMPERATURE: 97.6 F | HEIGHT: 69 IN | BODY MASS INDEX: 31.84 KG/M2

## 2021-04-06 VITALS — TEMPERATURE: 97.7 F | SYSTOLIC BLOOD PRESSURE: 128 MMHG | DIASTOLIC BLOOD PRESSURE: 77 MMHG | OXYGEN SATURATION: 99 %

## 2021-04-06 LAB
-: NORMAL
ABSOLUTE EOS #: 0.2 K/UL (ref 0–0.4)
ABSOLUTE IMMATURE GRANULOCYTE: ABNORMAL K/UL (ref 0–0.3)
ABSOLUTE LYMPH #: 1.6 K/UL (ref 1–4.8)
ABSOLUTE MONO #: 0.8 K/UL (ref 0.1–1.3)
ALBUMIN SERPL-MCNC: 3.8 G/DL (ref 3.5–5.2)
ALBUMIN/GLOBULIN RATIO: ABNORMAL (ref 1–2.5)
ALP BLD-CCNC: 87 U/L (ref 35–104)
ALT SERPL-CCNC: 59 U/L (ref 5–33)
ANION GAP SERPL CALCULATED.3IONS-SCNC: 7 MMOL/L (ref 9–17)
AST SERPL-CCNC: 59 U/L
BASOPHILS # BLD: 0 % (ref 0–2)
BASOPHILS ABSOLUTE: 0 K/UL (ref 0–0.2)
BILIRUB SERPL-MCNC: 0.57 MG/DL (ref 0.3–1.2)
BILIRUBIN DIRECT: 0.19 MG/DL
BILIRUBIN, INDIRECT: 0.38 MG/DL (ref 0–1)
BUN BLDV-MCNC: 5 MG/DL (ref 6–20)
BUN/CREAT BLD: ABNORMAL (ref 9–20)
CALCIUM SERPL-MCNC: 8.5 MG/DL (ref 8.6–10.4)
CHLORIDE BLD-SCNC: 108 MMOL/L (ref 98–107)
CO2: 22 MMOL/L (ref 20–31)
CREAT SERPL-MCNC: 0.7 MG/DL (ref 0.5–0.9)
DIFFERENTIAL TYPE: ABNORMAL
EOSINOPHILS RELATIVE PERCENT: 2 % (ref 0–4)
GFR AFRICAN AMERICAN: >60 ML/MIN
GFR NON-AFRICAN AMERICAN: >60 ML/MIN
GFR SERPL CREATININE-BSD FRML MDRD: ABNORMAL ML/MIN/{1.73_M2}
GFR SERPL CREATININE-BSD FRML MDRD: ABNORMAL ML/MIN/{1.73_M2}
GLOBULIN: ABNORMAL G/DL (ref 1.5–3.8)
GLUCOSE BLD-MCNC: 107 MG/DL (ref 70–99)
HCG, PREGNANCY URINE (POC): NEGATIVE
HCT VFR BLD CALC: 39.8 % (ref 36–46)
HEMOGLOBIN: 13.4 G/DL (ref 12–16)
IMMATURE GRANULOCYTES: ABNORMAL %
LIPASE: 27 U/L (ref 13–60)
LYMPHOCYTES # BLD: 15 % (ref 24–44)
MCH RBC QN AUTO: 30.3 PG (ref 26–34)
MCHC RBC AUTO-ENTMCNC: 33.8 G/DL (ref 31–37)
MCV RBC AUTO: 89.8 FL (ref 80–100)
MONOCYTES # BLD: 7 % (ref 1–7)
NRBC AUTOMATED: ABNORMAL PER 100 WBC
PDW BLD-RTO: 13.1 % (ref 11.5–14.9)
PLATELET # BLD: 329 K/UL (ref 150–450)
PLATELET ESTIMATE: ABNORMAL
PMV BLD AUTO: 7 FL (ref 6–12)
POTASSIUM SERPL-SCNC: 3.8 MMOL/L (ref 3.7–5.3)
RBC # BLD: 4.44 M/UL (ref 4–5.2)
RBC # BLD: ABNORMAL 10*6/UL
SEG NEUTROPHILS: 76 % (ref 36–66)
SEGMENTED NEUTROPHILS ABSOLUTE COUNT: 8.5 K/UL (ref 1.3–9.1)
SODIUM BLD-SCNC: 137 MMOL/L (ref 135–144)
TOTAL PROTEIN: 6.9 G/DL (ref 6.4–8.3)
WBC # BLD: 11.2 K/UL (ref 3.5–11)
WBC # BLD: ABNORMAL 10*3/UL

## 2021-04-06 PROCEDURE — 7100000000 HC PACU RECOVERY - FIRST 15 MIN: Performed by: SURGERY

## 2021-04-06 PROCEDURE — 99999 PR OFFICE/OUTPT VISIT,PROCEDURE ONLY: CPT | Performed by: PHYSICIAN ASSISTANT

## 2021-04-06 PROCEDURE — 81025 URINE PREGNANCY TEST: CPT

## 2021-04-06 PROCEDURE — 6370000000 HC RX 637 (ALT 250 FOR IP): Performed by: SURGERY

## 2021-04-06 PROCEDURE — 2709999900 HC NON-CHARGEABLE SUPPLY: Performed by: SURGERY

## 2021-04-06 PROCEDURE — 80048 BASIC METABOLIC PNL TOTAL CA: CPT

## 2021-04-06 PROCEDURE — 36415 COLL VENOUS BLD VENIPUNCTURE: CPT

## 2021-04-06 PROCEDURE — 2580000003 HC RX 258: Performed by: ANESTHESIOLOGY

## 2021-04-06 PROCEDURE — 2580000003 HC RX 258: Performed by: SURGERY

## 2021-04-06 PROCEDURE — 3600000019 HC SURGERY ROBOT ADDTL 15MIN: Performed by: SURGERY

## 2021-04-06 PROCEDURE — 7100000001 HC PACU RECOVERY - ADDTL 15 MIN: Performed by: SURGERY

## 2021-04-06 PROCEDURE — 6360000002 HC RX W HCPCS: Performed by: NURSE ANESTHETIST, CERTIFIED REGISTERED

## 2021-04-06 PROCEDURE — 6360000002 HC RX W HCPCS: Performed by: ANESTHESIOLOGY

## 2021-04-06 PROCEDURE — 2500000003 HC RX 250 WO HCPCS: Performed by: SURGERY

## 2021-04-06 PROCEDURE — 88304 TISSUE EXAM BY PATHOLOGIST: CPT

## 2021-04-06 PROCEDURE — 2500000003 HC RX 250 WO HCPCS: Performed by: NURSE ANESTHETIST, CERTIFIED REGISTERED

## 2021-04-06 PROCEDURE — 0FT44ZZ RESECTION OF GALLBLADDER, PERCUTANEOUS ENDOSCOPIC APPROACH: ICD-10-PCS | Performed by: SURGERY

## 2021-04-06 PROCEDURE — 99238 HOSP IP/OBS DSCHRG MGMT 30/<: CPT | Performed by: INTERNAL MEDICINE

## 2021-04-06 PROCEDURE — 8E0W4CZ ROBOTIC ASSISTED PROCEDURE OF TRUNK REGION, PERCUTANEOUS ENDOSCOPIC APPROACH: ICD-10-PCS | Performed by: SURGERY

## 2021-04-06 PROCEDURE — 3600000009 HC SURGERY ROBOT BASE: Performed by: SURGERY

## 2021-04-06 PROCEDURE — S2900 ROBOTIC SURGICAL SYSTEM: HCPCS | Performed by: SURGERY

## 2021-04-06 PROCEDURE — G0378 HOSPITAL OBSERVATION PER HR: HCPCS

## 2021-04-06 PROCEDURE — 6360000002 HC RX W HCPCS: Performed by: SURGERY

## 2021-04-06 PROCEDURE — 3700000000 HC ANESTHESIA ATTENDED CARE: Performed by: SURGERY

## 2021-04-06 PROCEDURE — 85025 COMPLETE CBC W/AUTO DIFF WBC: CPT

## 2021-04-06 PROCEDURE — 83690 ASSAY OF LIPASE: CPT

## 2021-04-06 PROCEDURE — 6370000000 HC RX 637 (ALT 250 FOR IP)

## 2021-04-06 PROCEDURE — 80076 HEPATIC FUNCTION PANEL: CPT

## 2021-04-06 PROCEDURE — 3700000001 HC ADD 15 MINUTES (ANESTHESIA): Performed by: SURGERY

## 2021-04-06 RX ORDER — ONDANSETRON 4 MG/1
TABLET, FILM COATED ORAL
Qty: 20 TABLET | Refills: 0 | Status: SHIPPED | OUTPATIENT
Start: 2021-04-06

## 2021-04-06 RX ORDER — ROCURONIUM BROMIDE 10 MG/ML
INJECTION, SOLUTION INTRAVENOUS PRN
Status: DISCONTINUED | OUTPATIENT
Start: 2021-04-06 | End: 2021-04-06 | Stop reason: SDUPTHER

## 2021-04-06 RX ORDER — ESMOLOL HYDROCHLORIDE 10 MG/ML
INJECTION INTRAVENOUS PRN
Status: DISCONTINUED | OUTPATIENT
Start: 2021-04-06 | End: 2021-04-06 | Stop reason: SDUPTHER

## 2021-04-06 RX ORDER — SODIUM CHLORIDE, SODIUM LACTATE, POTASSIUM CHLORIDE, CALCIUM CHLORIDE 600; 310; 30; 20 MG/100ML; MG/100ML; MG/100ML; MG/100ML
INJECTION, SOLUTION INTRAVENOUS CONTINUOUS
Status: DISCONTINUED | OUTPATIENT
Start: 2021-04-06 | End: 2021-04-06

## 2021-04-06 RX ORDER — MIDAZOLAM HYDROCHLORIDE 1 MG/ML
INJECTION INTRAMUSCULAR; INTRAVENOUS PRN
Status: DISCONTINUED | OUTPATIENT
Start: 2021-04-06 | End: 2021-04-06 | Stop reason: SDUPTHER

## 2021-04-06 RX ORDER — HYDROMORPHONE HCL 110MG/55ML
PATIENT CONTROLLED ANALGESIA SYRINGE INTRAVENOUS PRN
Status: DISCONTINUED | OUTPATIENT
Start: 2021-04-06 | End: 2021-04-06 | Stop reason: SDUPTHER

## 2021-04-06 RX ORDER — PROPOFOL 10 MG/ML
INJECTION, EMULSION INTRAVENOUS PRN
Status: DISCONTINUED | OUTPATIENT
Start: 2021-04-06 | End: 2021-04-06 | Stop reason: SDUPTHER

## 2021-04-06 RX ORDER — OXYCODONE HYDROCHLORIDE AND ACETAMINOPHEN 5; 325 MG/1; MG/1
1 TABLET ORAL EVERY 4 HOURS PRN
Status: DISCONTINUED | OUTPATIENT
Start: 2021-04-06 | End: 2021-04-06 | Stop reason: HOSPADM

## 2021-04-06 RX ORDER — MEPERIDINE HYDROCHLORIDE 25 MG/ML
12.5 INJECTION INTRAMUSCULAR; INTRAVENOUS; SUBCUTANEOUS EVERY 5 MIN PRN
Status: DISCONTINUED | OUTPATIENT
Start: 2021-04-06 | End: 2021-04-06 | Stop reason: HOSPADM

## 2021-04-06 RX ORDER — HYDRALAZINE HYDROCHLORIDE 20 MG/ML
5 INJECTION INTRAMUSCULAR; INTRAVENOUS EVERY 10 MIN PRN
Status: DISCONTINUED | OUTPATIENT
Start: 2021-04-06 | End: 2021-04-06 | Stop reason: HOSPADM

## 2021-04-06 RX ORDER — FENTANYL CITRATE 50 UG/ML
INJECTION, SOLUTION INTRAMUSCULAR; INTRAVENOUS PRN
Status: DISCONTINUED | OUTPATIENT
Start: 2021-04-06 | End: 2021-04-06 | Stop reason: SDUPTHER

## 2021-04-06 RX ORDER — OXYCODONE HYDROCHLORIDE AND ACETAMINOPHEN 5; 325 MG/1; MG/1
1 TABLET ORAL EVERY 6 HOURS PRN
Qty: 28 TABLET | Refills: 0 | Status: SHIPPED | OUTPATIENT
Start: 2021-04-06 | End: 2021-04-13

## 2021-04-06 RX ORDER — SCOLOPAMINE TRANSDERMAL SYSTEM 1 MG/1
PATCH, EXTENDED RELEASE TRANSDERMAL
Status: DISCONTINUED
Start: 2021-04-06 | End: 2021-04-06 | Stop reason: HOSPADM

## 2021-04-06 RX ORDER — ONDANSETRON 2 MG/ML
INJECTION INTRAMUSCULAR; INTRAVENOUS PRN
Status: DISCONTINUED | OUTPATIENT
Start: 2021-04-06 | End: 2021-04-06 | Stop reason: SDUPTHER

## 2021-04-06 RX ORDER — METOCLOPRAMIDE HYDROCHLORIDE 5 MG/ML
10 INJECTION INTRAMUSCULAR; INTRAVENOUS
Status: DISCONTINUED | OUTPATIENT
Start: 2021-04-06 | End: 2021-04-06 | Stop reason: HOSPADM

## 2021-04-06 RX ORDER — LEVOFLOXACIN 500 MG/1
500 TABLET, FILM COATED ORAL DAILY
Qty: 10 TABLET | Refills: 0 | Status: SHIPPED | OUTPATIENT
Start: 2021-04-06 | End: 2021-04-16

## 2021-04-06 RX ORDER — DIPHENHYDRAMINE HYDROCHLORIDE 50 MG/ML
12.5 INJECTION INTRAMUSCULAR; INTRAVENOUS
Status: DISCONTINUED | OUTPATIENT
Start: 2021-04-06 | End: 2021-04-06 | Stop reason: HOSPADM

## 2021-04-06 RX ORDER — 0.9 % SODIUM CHLORIDE 0.9 %
500 INTRAVENOUS SOLUTION INTRAVENOUS
Status: DISCONTINUED | OUTPATIENT
Start: 2021-04-06 | End: 2021-04-06 | Stop reason: HOSPADM

## 2021-04-06 RX ORDER — DEXAMETHASONE SODIUM PHOSPHATE 4 MG/ML
INJECTION, SOLUTION INTRA-ARTICULAR; INTRALESIONAL; INTRAMUSCULAR; INTRAVENOUS; SOFT TISSUE PRN
Status: DISCONTINUED | OUTPATIENT
Start: 2021-04-06 | End: 2021-04-06 | Stop reason: SDUPTHER

## 2021-04-06 RX ORDER — SCOLOPAMINE TRANSDERMAL SYSTEM 1 MG/1
1 PATCH, EXTENDED RELEASE TRANSDERMAL ONCE
Status: DISCONTINUED | OUTPATIENT
Start: 2021-04-06 | End: 2021-04-06 | Stop reason: HOSPADM

## 2021-04-06 RX ORDER — LIDOCAINE HYDROCHLORIDE 10 MG/ML
INJECTION, SOLUTION EPIDURAL; INFILTRATION; INTRACAUDAL; PERINEURAL PRN
Status: DISCONTINUED | OUTPATIENT
Start: 2021-04-06 | End: 2021-04-06 | Stop reason: SDUPTHER

## 2021-04-06 RX ORDER — PROMETHAZINE HYDROCHLORIDE 25 MG/ML
6.25 INJECTION, SOLUTION INTRAMUSCULAR; INTRAVENOUS
Status: DISCONTINUED | OUTPATIENT
Start: 2021-04-06 | End: 2021-04-06 | Stop reason: HOSPADM

## 2021-04-06 RX ORDER — OXYCODONE HYDROCHLORIDE AND ACETAMINOPHEN 5; 325 MG/1; MG/1
1 TABLET ORAL
Status: DISCONTINUED | OUTPATIENT
Start: 2021-04-06 | End: 2021-04-06 | Stop reason: HOSPADM

## 2021-04-06 RX ORDER — BUPIVACAINE HYDROCHLORIDE 5 MG/ML
INJECTION, SOLUTION EPIDURAL; INTRACAUDAL PRN
Status: DISCONTINUED | OUTPATIENT
Start: 2021-04-06 | End: 2021-04-06 | Stop reason: HOSPADM

## 2021-04-06 RX ORDER — LABETALOL HYDROCHLORIDE 5 MG/ML
5 INJECTION, SOLUTION INTRAVENOUS EVERY 10 MIN PRN
Status: DISCONTINUED | OUTPATIENT
Start: 2021-04-06 | End: 2021-04-06 | Stop reason: HOSPADM

## 2021-04-06 RX ORDER — FENTANYL CITRATE 50 UG/ML
25 INJECTION, SOLUTION INTRAMUSCULAR; INTRAVENOUS EVERY 5 MIN PRN
Status: DISCONTINUED | OUTPATIENT
Start: 2021-04-06 | End: 2021-04-06 | Stop reason: HOSPADM

## 2021-04-06 RX ADMIN — FENTANYL CITRATE 100 MCG: 50 INJECTION, SOLUTION INTRAMUSCULAR; INTRAVENOUS at 08:35

## 2021-04-06 RX ADMIN — HYDROMORPHONE HYDROCHLORIDE 1 MG: 2 INJECTION, SOLUTION INTRAMUSCULAR; INTRAVENOUS; SUBCUTANEOUS at 09:05

## 2021-04-06 RX ADMIN — PIPERACILLIN SODIUM AND TAZOBACTAM SODIUM 3375 MG: 3; .375 INJECTION, POWDER, LYOPHILIZED, FOR SOLUTION INTRAVENOUS at 11:39

## 2021-04-06 RX ADMIN — FAMOTIDINE 20 MG: 10 INJECTION, SOLUTION INTRAVENOUS at 11:39

## 2021-04-06 RX ADMIN — ONDANSETRON 4 MG: 2 INJECTION, SOLUTION INTRAMUSCULAR; INTRAVENOUS at 08:44

## 2021-04-06 RX ADMIN — SODIUM CHLORIDE, POTASSIUM CHLORIDE, SODIUM LACTATE AND CALCIUM CHLORIDE: 600; 310; 30; 20 INJECTION, SOLUTION INTRAVENOUS at 08:24

## 2021-04-06 RX ADMIN — ONDANSETRON 4 MG: 2 INJECTION, SOLUTION INTRAMUSCULAR; INTRAVENOUS at 09:48

## 2021-04-06 RX ADMIN — ESMOLOL HYDROCHLORIDE 20 MG: 10 INJECTION, SOLUTION INTRAVENOUS at 09:26

## 2021-04-06 RX ADMIN — HYDROMORPHONE HYDROCHLORIDE 0.5 MG: 1 INJECTION, SOLUTION INTRAMUSCULAR; INTRAVENOUS; SUBCUTANEOUS at 10:35

## 2021-04-06 RX ADMIN — SODIUM CHLORIDE, POTASSIUM CHLORIDE, SODIUM LACTATE AND CALCIUM CHLORIDE: 600; 310; 30; 20 INJECTION, SOLUTION INTRAVENOUS at 10:49

## 2021-04-06 RX ADMIN — ESMOLOL HYDROCHLORIDE 30 MG: 10 INJECTION, SOLUTION INTRAVENOUS at 09:33

## 2021-04-06 RX ADMIN — MIDAZOLAM 1 MG: 1 INJECTION INTRAMUSCULAR; INTRAVENOUS at 08:29

## 2021-04-06 RX ADMIN — ROCURONIUM BROMIDE 50 MG: 10 INJECTION, SOLUTION INTRAVENOUS at 08:35

## 2021-04-06 RX ADMIN — ROCURONIUM BROMIDE 20 MG: 10 INJECTION, SOLUTION INTRAVENOUS at 09:18

## 2021-04-06 RX ADMIN — LIDOCAINE HYDROCHLORIDE 50 MG: 10 INJECTION, SOLUTION EPIDURAL; INFILTRATION; INTRACAUDAL; PERINEURAL at 08:35

## 2021-04-06 RX ADMIN — HYDROMORPHONE HYDROCHLORIDE 1 MG: 2 INJECTION, SOLUTION INTRAMUSCULAR; INTRAVENOUS; SUBCUTANEOUS at 09:08

## 2021-04-06 RX ADMIN — ESMOLOL HYDROCHLORIDE 20 MG: 10 INJECTION, SOLUTION INTRAVENOUS at 09:21

## 2021-04-06 RX ADMIN — SODIUM CHLORIDE, POTASSIUM CHLORIDE, SODIUM LACTATE AND CALCIUM CHLORIDE: 600; 310; 30; 20 INJECTION, SOLUTION INTRAVENOUS at 10:02

## 2021-04-06 RX ADMIN — SUGAMMADEX 200 MG: 100 INJECTION, SOLUTION INTRAVENOUS at 09:52

## 2021-04-06 RX ADMIN — DEXAMETHASONE SODIUM PHOSPHATE 8 MG: 4 INJECTION, SOLUTION INTRAMUSCULAR; INTRAVENOUS at 08:44

## 2021-04-06 RX ADMIN — OXYCODONE HYDROCHLORIDE AND ACETAMINOPHEN 1 TABLET: 5; 325 TABLET ORAL at 14:13

## 2021-04-06 RX ADMIN — PROPOFOL 200 MG: 10 INJECTION, EMULSION INTRAVENOUS at 08:35

## 2021-04-06 RX ADMIN — PIPERACILLIN SODIUM AND TAZOBACTAM SODIUM 3375 MG: 3; .375 INJECTION, POWDER, LYOPHILIZED, FOR SOLUTION INTRAVENOUS at 03:41

## 2021-04-06 RX ADMIN — ESMOLOL HYDROCHLORIDE 30 MG: 10 INJECTION, SOLUTION INTRAVENOUS at 09:48

## 2021-04-06 RX ADMIN — MIDAZOLAM 1 MG: 1 INJECTION INTRAMUSCULAR; INTRAVENOUS at 08:31

## 2021-04-06 ASSESSMENT — PAIN DESCRIPTION - LOCATION
LOCATION: HEAD
LOCATION: ABDOMEN
LOCATION: ABDOMEN

## 2021-04-06 ASSESSMENT — PULMONARY FUNCTION TESTS
PIF_VALUE: 20
PIF_VALUE: 21
PIF_VALUE: 21
PIF_VALUE: 15
PIF_VALUE: 14
PIF_VALUE: 21
PIF_VALUE: 17
PIF_VALUE: 20
PIF_VALUE: 19
PIF_VALUE: 15
PIF_VALUE: 2
PIF_VALUE: 15
PIF_VALUE: 21
PIF_VALUE: 18
PIF_VALUE: 1
PIF_VALUE: 15
PIF_VALUE: 15
PIF_VALUE: 21
PIF_VALUE: 7
PIF_VALUE: 21
PIF_VALUE: 15
PIF_VALUE: 21
PIF_VALUE: 4
PIF_VALUE: 13
PIF_VALUE: 15
PIF_VALUE: 28
PIF_VALUE: 15
PIF_VALUE: 22
PIF_VALUE: 20
PIF_VALUE: 15
PIF_VALUE: 1
PIF_VALUE: 17
PIF_VALUE: 21
PIF_VALUE: 21
PIF_VALUE: 22
PIF_VALUE: 14
PIF_VALUE: 21
PIF_VALUE: 22
PIF_VALUE: 15
PIF_VALUE: 21
PIF_VALUE: 21
PIF_VALUE: 1
PIF_VALUE: 21
PIF_VALUE: 21
PIF_VALUE: 22
PIF_VALUE: 21
PIF_VALUE: 19
PIF_VALUE: 15
PIF_VALUE: 22
PIF_VALUE: 13
PIF_VALUE: 22

## 2021-04-06 ASSESSMENT — PAIN DESCRIPTION - PAIN TYPE: TYPE: SURGICAL PAIN

## 2021-04-06 ASSESSMENT — PAIN DESCRIPTION - DESCRIPTORS
DESCRIPTORS: DULL;ACHING
DESCRIPTORS: DISCOMFORT;TIGHTNESS
DESCRIPTORS: DISCOMFORT;TIGHTNESS

## 2021-04-06 ASSESSMENT — PAIN SCALES - GENERAL
PAINLEVEL_OUTOF10: 4
PAINLEVEL_OUTOF10: 4
PAINLEVEL_OUTOF10: 7

## 2021-04-06 ASSESSMENT — PAIN DESCRIPTION - FREQUENCY: FREQUENCY: INTERMITTENT

## 2021-04-06 ASSESSMENT — PAIN DESCRIPTION - ONSET: ONSET: GRADUAL

## 2021-04-06 ASSESSMENT — PAIN DESCRIPTION - ORIENTATION: ORIENTATION: MID

## 2021-04-06 NOTE — PROGRESS NOTES
Pt. Returned to room 2071. Pt. Alert and oriented, slightly drowsy at this time. Pt. States pain is at a tolerable level. Pt. Denies any needs at this time, will continue to monitor.

## 2021-04-06 NOTE — PLAN OF CARE
Problem: Pain:  Goal: Pain level will decrease  Description: Pain level will decrease  4/6/2021 1702 by Mau Gandhi RN  Outcome: Ongoing   Pt. Pain is adequately controlled, see MAR. Problem: Nausea/Vomiting:  Goal: Absence of nausea/vomiting  Description: Absence of nausea/vomiting  4/6/2021 1702 by Mau Gandhi RN  Outcome: Ongoing   Pt. Denies any nausea/vomiting. Pt. Tolerating diet.    Problem: Nausea/Vomiting:  Goal: Able to drink  Description: Able to drink  4/6/2021 1702 by Mau Gandhi RN  Outcome: Ongoing     Problem: Nausea/Vomiting:  Goal: Able to eat  Description: Able to eat  4/6/2021 1702 by Mau Gandhi RN  Outcome: Ongoing

## 2021-04-06 NOTE — FLOWSHEET NOTE
SC visit with patient and her , Prince Roa; provided medical update and welcomed prayer     04/06/21 2614   Encounter Summary   Services provided to: Patient and family together   Referral/Consult From: [facility]   (4/6/21)   Complexity of Encounter Moderate   Length of Encounter 15 minutes   Spiritual Assessment Completed Yes   Grief and Life Adjustment   Type Adjustment to illness   Assessment Approachable; Anxious; Hopeful;Coping;Helplessness   Intervention Active listening;Prayer;Sustaining presence/ Ministry of presence; Discussed illness/injury and it's impact   Outcome Expressed gratitude;Engaged in conversation;Expressed feelings/needs/concerns;Coping; Hopeful;Receptive

## 2021-04-06 NOTE — CARE COORDINATION
ONGOING DISCHARGE PLAN:    Patient is alert and oriented x4, drowsy from Surgery. Spoke with patient's , regarding discharge plan and he confirms that plan is still to return to home w/ him w/ no needs. Denies VNS. Pt. Had Elissa Lap today. General Diet. IV Zosyn. Plans per surgery. Will continue to follow for additional discharge needs.     Electronically signed by Darling Zheng RN on 4/6/2021 at 11:44 AM

## 2021-04-06 NOTE — OP NOTE
Operative Note      Patient: Juan Breaux  YOB: 1980  MRN: [id number]    Date of Procedure: 4/6/2021    Pre-Op Diagnosis: ACUTE CHOLECYSTITIS  COVID NEG    Post-Op Diagnosis: Same       Procedure(s):  CHOLECYSTECTOMY LAPAROSCOPIC ROBOTIC XI    Surgeon(s):  Esteban Flannery MD    Assistant:   First Assistant: CHANDRAKANT Bales    Anesthesia: General    Estimated Blood Loss (mL): less than 50     Complications: None    Specimens:   ID Type Source Tests Collected by Time Destination   A : gallbladder  Tissue Gallbladder SURGICAL PATHOLOGY Esteban Flannery MD 4/6/2021 0950        Implants:  * No implants in log *      Drains: * No LDAs found *    Findings: Dictated    Detailed Description of Procedure:   24-year-old female diagnosed with acute calculus cholecystitis was taken to the operating room for robotic cholecystectomy. Informed consent was obtained. Patient was given preoperative Zosyn. Patient was taken to the operating room. General anesthesia was given. Abdomen was prepped and draped in usual sterile fashion. Timeout was done. Subumbilical incision was made. Dorenda Stacey port was introduced using the open technique without any difficulty. CO2 insufflation was carried out. Scope was introduced. Patient was placed in the reverse Trendelenburg position. 3 additional ports were placed in the usual fashion. Robot was brought in. All the ports were docked in the usual fashion. Camera and the ancillary instruments were advanced towards the target anatomy. Assistant grabbed the fundus of the gallbladder and that was retracted anterosuperiorly. Careful dissection was continued in the triangle of calot. Cystic duct was isolated was skeletonized. Cystic artery was isolated was skeletonized. Critical view was obtained. Anatomy was confirmed. After confirming the anatomy cystic duct was clipped and divided. Cystic artery was clipped and divided.   Gallbladder was peeled off the liver bed using the hook cautery. Complete hemostasis was confirmed. All the clips were found to be intact. Gallbladder fossa was irrigated until the returning fluid was clear. At this point robot was undocked. Gallbladder was retrieved using an Endo Catch and sent to pathology for further evaluation. All the port sites are visualized. No bleeding noted. All the ports were removed. Fascia and the skin was approximated in the usual fashion. Local anesthetic was infiltrated. Dermabond was applied. Steri-Strips were applied. Sterile dressing was applied. Patient tolerated procedure well and was transferred to the recovery room in a stable condition. Recommendations: Resume diet. Resume IV antibiotics and pain control. No family here to discuss the findings.     Electronically signed by Rodrick Hardwick MD on 4/6/2021 at 10:27 AM

## 2021-04-06 NOTE — PROGRESS NOTES
Pt. Ambulated in hallway with stand by assist, pt. Tolerated well. Pt. Pain is adequately controlled at this time.

## 2021-04-06 NOTE — ADT AUTH CERT
Gallbladder or Bile Duct Inflammation or Stone - Care Day 2 (4/5/2021) by Cornelia Williamson LPN       Review Status Review Entered   Completed 4/6/2021 13:36      Criteria Review      Care Day: 2 Care Date: 4/5/2021 Level of Care: Inpatient Floor    Guideline Day 1    Level Of Care    (X) Floor or ICU [D]    Clinical Status    ( ) * Clinical Indications met [E]    4/6/2021 1:36 PM EDT by Michael Peeling      98.5 15 88 100/66 98% RA  schedule patient for robotic laparoscopic cholecystectomy tomorrow    (X) Pain, fever, or vomiting    Routes    (X) NPO    4/6/2021 1:36 PM EDT by Michael Peeling      yes    (X) IV fluids, medications    4/6/2021 1:36 PM EDT by Michael Peeling      IV pepcid 20mg BID  IV NS 150mL/hr  IV zofran 4 mg q 6 hrs PRN x1    Interventions    (X) Possible CT, MRI, or HIDA scan    4/6/2021 1:36 PM EDT by Michael Peeling      HIDA scan today    (X) Laboratory tests [F]    4/6/2021 1:36 PM EDT by Michael Peeling      Daily CBC, BMP, trend LFT's    Medications    (X) Possible antibiotics    4/6/2021 1:36 PM EDT by Michael Peeling      IV Zosyn 3375 q 8 hours    * Milestone   Additional Notes   4/5/2021      Vitals:       ** **General Surgery Consult   CONSTITUTIONAL: Alert and oriented times 3, no acute distress and cooperative to examination with proper mood and affect. SKIN: Skin color, texture, turgor normal. No rashes or lesions. LYMPH: no cervical nodes, no inguinal nodes   HEENT: Head is normocephalic, atraumatic. EOMI, PERRLA   NECK: Supple, symmetrical, trachea midline, no adenopathy, thyroid symmetric, not enlarged and no tenderness, skin normal   CHEST/LUNGS: chest symmetric with normal A/P diameter, normal respiratory rate and rhythm, lungs clear to auscultation without wheezes, rales or rhonchi. No accessory muscle use. Scars None    CARDIOVASCULAR: Heart regular rate and rhythm Normal S1 and S2. . Carotid and femoral pulses 2+/4 and equal bilaterally   ABDOMEN: Normal shape. . No scar(s) present.  Normal bowel sounds.  No bruits.  Soft, nondistended, no masses or organomegaly. no evidence of hernia. Percussion: Normal without hepatosplenomegally. Tenderness: RUQ   RECTAL: deferred, not clinically indicated   NEUROLOGIC: There are no focalizing motor or sensory deficits. CN II-XII are grossly intact. EXTREMITIES: no cyanosis, no clubbing and no edema   IMPRESSION:   1. Acute cholecystitis    2. No abdominal surgeries in the past       does not have any pertinent problems on file.       PLAN:   1. NPO   2. HIDA scan today   3. Daily CBC, BMP, trend LFT's   4. IVF, IV Zosyn   5. GI prophylaxis    6. Pain and nausea management    7. Will tentatively schedule patient for robotic laparoscopic cholecystectomy tomorrow   8. Continue medical management      Labs:   Chloride: 108 (H)   Anion Gap: 7 (L)   Glucose: 121 (H)   Calcium: 8.3 (L)   Total Protein: 5.7 (L)   Monocytes: 8 (H)      NM HEPATOBILIARY   Nonvisualization of the gallbladder within 60 minutes.  Gallbladder was   visualized following morphine administration.  Findings are most compatible   with chronic cholecystitis.       Additional Medications:   enoxaparin (LOVENOX) injection 40 mg    Dose: 40 mg   Freq: ONCE Route: SC      morphine sulfate (PF) injection 3.9 mg    Dose: 3.9 mg   Freq: ONCE Route: IV      acetaminophen (TYLENOL) tablet 650 mg    Dose: 650 mg   Freq: EVERY 6 HOURS PRN Route: PO   PRN  x1

## 2021-04-06 NOTE — DISCHARGE SUMMARY
[place] [address] Internal Medicine    Discharge Summary     Patient ID: Martin Melendez  :  1980   MRN: [id number]     ACCOUNT:  [de-identified]   Patient's PCP: Daniel Hammond Date: 2021   Discharge Date: 4/15/2021    Length of Stay: 2  Code Status:  No Order  Admitting Physician: Marixa Velasco MD  Discharge Physician: Marixa Velasco MD     Active Discharge Diagnoses:     Primary Problem  <principal problem not specified>      [place] Problems    Diagnosis Date Noted    Acute cholecystitis [K81.0] 2021       Admission Condition:  fair     Discharged Condition: fair    Hospital Stay:     Hospital Course: Martin Melendez is a 36 y.o. female who was admitted for the management of <principal problem not specified> , presented to ER with No chief complaint on file. Patient is a very healthy young woman, does not take any medication on regular basis, she was admitted yesterday with abdominal pain. Abdominal pain started on Saturday night around 11 PM, constant in nature, associated with nausea, mainly located right upper quadrant, no aggravating or relieving factor. Patient denied any complaints of loose stools, blood in stools, no vaginal discharge or vaginal bleeding. Patient presented to outlying facility, underwent ultrasound abdomen concerning for acute cholecystitis, patient transferred to [facility] she underwent CT abdomen pelvis, subsequently undergo HIDA scan concerning for cholecystitis, patient was evaluated by general surgery, underwent laparoscopic cholecystectomy  Was treated with antibiotic during hospital  We will follow up with general surgeon as outpatient.         Significant therapeutic interventions:     Significant Diagnostic Studies:   Labs / Micro:        ,     Radiology:    Nm Hepatobiliary    Result Date: 2021  EXAMINATION: HEPATOBILIARY SCINTIGRAPHY (HIDA SCAN) COMPARISON: CT 2021 HISTORY: ORDERING SYSTEM PROVIDED HISTORY: acute cholecystitis TECHNOLOGIST PROVIDED HISTORY: acute cholecystitis Is the patient pregnant?->No Reason for Exam: Acute Cholecystitis Acuity: Unknown Type of Exam: Unknown TECHNIQUE: Following IV injection of 6.9 mCi of technetium 99m Choletec, anterior imaging of the abdomen was acquired for 60 minutes. After nonvisualization of the gallbladder, 3.9mg Morphine SO4 was administered IV with a booster dose of 2.5 millicuries of technetium 99 m mebrofenin and additional imaging was acquired. FINDINGS: There was satisfactory uptake of radiopharmaceutical by the liver. The gallbladder was not visualized within 60 minutes of imaging. Following administration of a booster dose and morphine, the gallbladder was visualized after additional 30 minutes of imaging.,     Nonvisualization of the gallbladder within 60 minutes. Gallbladder was visualized following morphine administration. Findings are most compatible with chronic cholecystitis. Ct Abdomen Pelvis W Iv Contrast Additional Contrast? Oral    Result Date: 4/4/2021  EXAMINATION: CT OF THE ABDOMEN AND PELVIS WITH CONTRAST 4/4/2021 3:09 pm TECHNIQUE: CT of the abdomen and pelvis was performed with the administration of intravenous contrast. Multiplanar reformatted images are provided for review. Dose modulation, iterative reconstruction, and/or weight based adjustment of the mA/kV was utilized to reduce the radiation dose to as low as reasonably achievable. COMPARISON: None. HISTORY: ORDERING SYSTEM PROVIDED HISTORY: ab pain, cholecystitis TECHNOLOGIST PROVIDED HISTORY: ab pain, cholecystitis Reason for Exam: ab pain for one day, cholecystitis Acuity: Unknown Type of Exam: Unknown FINDINGS: Lower Chest: Dependent changes are present within the lung bases.  Organs: A 9 mm low-attenuation lesion is present within the posterior segment of the right lobe of the liver, suggesting a small hemangioma or cyst. Faintly calcified Findings:    Diet: cardiac diet    Activity: As tolerated    Instructions to Patient:     Discharge Medications:      Medication List      START taking these medications    levoFLOXacin 500 MG tablet  Commonly known as: LEVAQUIN  Take 1 tablet by mouth daily for 10 days     ondansetron 4 MG tablet  Commonly known as: Zofran  Take every six hours as needed        ASK your doctor about these medications    oxyCODONE-acetaminophen 5-325 MG per tablet  Commonly known as: Percocet  Take 1 tablet by mouth every 6 hours as needed for Pain for up to 7 days. . Take lowest dose possible to manage pain  Ask about: Should I take this medication? Where to Get Your Medications      These medications were sent to [address] [place] [address] Rd, [address] [address]    Phone: [phone]   · levoFLOXacin 500 MG tablet  · ondansetron 4 MG tablet  · oxyCODONE-acetaminophen 5-325 MG per tablet         Time Spent on discharge is  35 mins in patient examination, evaluation, counseling as well as medication reconciliation, prescriptions for required medications, discharge plan and follow up. Electronically signed by   Mechelle Oswald MD  4/15/2021  12:52 PM      Thank you Dr. Vinita Foster for the opportunity to be involved in this patient's care.

## 2021-04-06 NOTE — PROGRESS NOTES
Pt. Discharged in stable condition. Discharge teaching and instructions for procedure of a lap choley completed with patient using teachback method. AVS reviewed. Patient voiced understanding regarding prescriptions, follow up appointments, and care of self at home.  Discharged in a wheelchair to  home with support per family

## 2021-04-07 ENCOUNTER — CARE COORDINATION (OUTPATIENT)
Dept: CASE MANAGEMENT | Age: 41
End: 2021-04-07

## 2021-04-07 LAB — SURGICAL PATHOLOGY REPORT: NORMAL

## 2021-04-07 NOTE — CARE COORDINATION
[facility] Transitions Initial Follow Up Call    Call within 2 business days of discharge: Yes    Patient: Jacy De La Garza Patient : 1980   MRN: <[id number]>  Reason for Admission : complaint of abdominal pain  choleycystitis   Discharge Date: 21 RARS: Readmission Risk Score: 8      Last Discharge [facility]       Complaint Diagnosis Description Type Department Provider    21  Acute cholecystitis Admission (Discharged) Jenn Feldman MD           Spoke with: 24 hour initial call. No answer. Left HIPPA compliant message    Facility: [address] [place] [address]    Non-face-to-face services provided:  N/A    Care Transitions 24 Hour Call    Care Transitions Interventions         Follow Up  No future appointments.     ABNER Lundberg Mess ABNER Care Transitions Nurse   [phone]

## 2021-04-09 LAB
EKG ATRIAL RATE: 87 BPM
EKG P AXIS: 56 DEGREES
EKG P-R INTERVAL: 184 MS
EKG Q-T INTERVAL: 368 MS
EKG QRS DURATION: 82 MS
EKG QTC CALCULATION (BAZETT): 442 MS
EKG R AXIS: 61 DEGREES
EKG T AXIS: 58 DEGREES
EKG VENTRICULAR RATE: 87 BPM

## (undated) DEVICE — GLOVE ORTHO 7 1/2   MSG9475

## (undated) DEVICE — SYRINGE, LUER SLIP, 20ML: Brand: MEDLINE

## (undated) DEVICE — SUTURE MCRYL + SZ 4-0 L27IN ABSRB UD L19MM PS-2 3/8 CIR MCP426H

## (undated) DEVICE — ADHESIVE SKIN CLOSURE TOP 36 CC HI VISC DERMBND MINI

## (undated) DEVICE — GOWN,AURORA,NONREINFORCED,LARGE: Brand: MEDLINE

## (undated) DEVICE — MERCY HEALTH ST CHARLES: Brand: MEDLINE INDUSTRIES, INC.

## (undated) DEVICE — GOWN,SIRUS,NONRNF,SETINSLV,XL,20/CS: Brand: MEDLINE

## (undated) DEVICE — COVER,MAYO STAND,XL,STERILE: Brand: MEDLINE

## (undated) DEVICE — ST CHARLES GEN LAPAROSCOPY PK: Brand: MEDLINE INDUSTRIES, INC.

## (undated) DEVICE — ADHESIVE SKIN CLOSURE 0.7CC TOP MICROBIAL APPL DERMBND ADV

## (undated) DEVICE — Z INACTIVE USE 2653177 SPONGE GZ W2XL2IN NONWOVEN 4 PLY FASTER WICKING ABIL AVANT

## (undated) DEVICE — BLANKET WRM W29.9XL79.1IN UP BODY FORC AIR MISTRAL-AIR

## (undated) DEVICE — SUTURE PDS II SZ 0 L27IN ABSRB VLT UR-6 L26MM 1/2 CIR D7185

## (undated) DEVICE — ARM DRAPE

## (undated) DEVICE — Z INACTIVE USE 2641839 CLIP INT M L POLYMER LOK LIG HEM O LOK

## (undated) DEVICE — TROCARS: Brand: KII® BALLOON BLUNT TIP SYSTEM

## (undated) DEVICE — BLADELESS OBTURATOR: Brand: WECK VISTA

## (undated) DEVICE — COVER,TABLE,60X90,STERILE: Brand: MEDLINE

## (undated) DEVICE — TROCAR: Brand: KII FIOS FIRST ENTRY

## (undated) DEVICE — CANNULA SEAL

## (undated) DEVICE — BAG SPEC REM 224ML W4XL6IN DIA10MM 1 HND GYN DISP ENDOPCH

## (undated) DEVICE — SUCTION IRRIGATOR: Brand: ENDOWRIST